# Patient Record
(demographics unavailable — no encounter records)

---

## 2024-11-29 NOTE — HISTORY OF PRESENT ILLNESS
[de-identified] : 63F, here for new patient annual physical. Pt's prior PCP moved.  HLD on atorvastatin 10mg  Hypothyroidism: on levothyroxine 75mcg, last b/w was in 05/2024 pt will take Flu vax today.  Pt saw pulmonologist earlier this week for an episode of bronchitis in 10/2024, s/p xray, to repeat in 1-2 weeks. Pt scheduled to follow up 2/2025.  Pt had Shingles in the past, needs to get Shingrix vaccine series.

## 2024-11-29 NOTE — HEALTH RISK ASSESSMENT
[Patient reported mammogram was normal] : Patient reported mammogram was normal [Patient reported PAP Smear was normal] : Patient reported PAP Smear was normal [Patient reported bone density results were normal] : Patient reported bone density results were normal [Patient reported colonoscopy was normal] : Patient reported colonoscopy was normal [Former] : Former [0-4] : 0-4 [> 15 Years] : > 15 Years [MammogramDate] : 1/2024 [PapSmearDate] : 1/2024 [BoneDensityComments] : ordered by gyn Dr. Michaud  [BoneDensityDate] : 1/2024  [ColonoscopyDate] : 1/2024 [ColonoscopyComments] : negative, pending GI appt in 2/2024,  [de-identified] : social smoker, quit in her 30s

## 2024-11-29 NOTE — HEALTH RISK ASSESSMENT
[Patient reported mammogram was normal] : Patient reported mammogram was normal [Patient reported PAP Smear was normal] : Patient reported PAP Smear was normal [Patient reported bone density results were normal] : Patient reported bone density results were normal [Patient reported colonoscopy was normal] : Patient reported colonoscopy was normal [Former] : Former [0-4] : 0-4 [> 15 Years] : > 15 Years [MammogramDate] : 1/2024 [PapSmearDate] : 1/2024 [BoneDensityDate] : 1/2024  [BoneDensityComments] : ordered by gyn Dr. Michaud  [ColonoscopyDate] : 1/2024 [ColonoscopyComments] : negative, pending GI appt in 2/2024,  [de-identified] : social smoker, quit in her 30s

## 2024-11-29 NOTE — HISTORY OF PRESENT ILLNESS
[de-identified] : 63F, here for new patient annual physical. Pt's prior PCP moved.  HLD on atorvastatin 10mg  Hypothyroidism: on levothyroxine 75mcg, last b/w was in 05/2024 pt will take Flu vax today.  Pt saw pulmonologist earlier this week for an episode of bronchitis in 10/2024, s/p xray, to repeat in 1-2 weeks. Pt scheduled to follow up 2/2025.  Pt had Shingles in the past, needs to get Shingrix vaccine series.

## 2024-11-29 NOTE — PLAN
[FreeTextEntry1] : 63F, here for annual physical.  elevated BP but no HTN diagnosis, pt advised weight loss, low salt diet,  HLD on lipitor 10mg, check lipid panel and CMP hypothyroidism, with weight gain and constipation, will check TFT, may need adjustment  HCM: Routine b/w to screen for DM, get baseline CBC, CMP, UA,  Flu vax today

## 2024-11-29 NOTE — PHYSICAL EXAM
[No Acute Distress] : no acute distress [Well Nourished] : well nourished [Well Developed] : well developed [Well-Appearing] : well-appearing [Normal Sclera/Conjunctiva] : normal sclera/conjunctiva [PERRL] : pupils equal round and reactive to light [EOMI] : extraocular movements intact [Normal Outer Ear/Nose] : the outer ears and nose were normal in appearance [Normal Oropharynx] : the oropharynx was normal [No JVD] : no jugular venous distention [No Lymphadenopathy] : no lymphadenopathy [Supple] : supple [Thyroid Normal, No Nodules] : the thyroid was normal and there were no nodules present [No Respiratory Distress] : no respiratory distress  [No Accessory Muscle Use] : no accessory muscle use [Clear to Auscultation] : lungs were clear to auscultation bilaterally [Normal Rate] : normal rate  [Regular Rhythm] : with a regular rhythm [Normal S1, S2] : normal S1 and S2 [No Murmur] : no murmur heard [No Carotid Bruits] : no carotid bruits [No Abdominal Bruit] : a ~M bruit was not heard ~T in the abdomen [Pedal Pulses Present] : the pedal pulses are present [No Edema] : there was no peripheral edema [No Palpable Aorta] : no palpable aorta [No Extremity Clubbing/Cyanosis] : no extremity clubbing/cyanosis [Soft] : abdomen soft [Non Tender] : non-tender [Non-distended] : non-distended [No HSM] : no HSM [Normal Bowel Sounds] : normal bowel sounds [Normal Posterior Cervical Nodes] : no posterior cervical lymphadenopathy [Normal Anterior Cervical Nodes] : no anterior cervical lymphadenopathy [No CVA Tenderness] : no CVA  tenderness [No Spinal Tenderness] : no spinal tenderness [No Joint Swelling] : no joint swelling [Grossly Normal Strength/Tone] : grossly normal strength/tone [No Rash] : no rash [Coordination Grossly Intact] : coordination grossly intact [No Focal Deficits] : no focal deficits [Normal Gait] : normal gait [Deep Tendon Reflexes (DTR)] : deep tendon reflexes were 2+ and symmetric [Normal Affect] : the affect was normal [Normal Insight/Judgement] : insight and judgment were intact [No Varicosities] : no varicosities [Normal Appearance] : normal in appearance [No Masses] : no palpable masses [No Nipple Discharge] : no nipple discharge [No Axillary Lymphadenopathy] : no axillary lymphadenopathy

## 2025-02-05 NOTE — PHYSICAL EXAM
[No Edema] : there was no peripheral edema [Coordination Grossly Intact] : coordination grossly intact [No Focal Deficits] : no focal deficits [Normal Gait] : normal gait [Normal Affect] : the affect was normal [Normal Insight/Judgement] : insight and judgment were intact [Normal Outer Ear/Nose] : the outer ears and nose were normal in appearance [Normal Oropharynx] : the oropharynx was normal [Normal] : no jugular venous distention, supple, no lymphadenopathy and the thyroid was normal and there were no nodules present [de-identified] : no sinus tenderness, no tonsilar enlargement or exudate

## 2025-02-05 NOTE — HISTORY OF PRESENT ILLNESS
[FreeTextEntry8] : 63F, here for an acute visit for concern of sinus infection.  Pt reports 1 week of post-nasal drip and runny nose and 2 to 3 days of sore throat. Pt reports no fever, no cough/shortness of breathing  pt used pseudoephedrine for sinus congestion.  Rapid Strep today is negative.

## 2025-02-05 NOTE — PHYSICAL EXAM
[No Edema] : there was no peripheral edema [Coordination Grossly Intact] : coordination grossly intact [No Focal Deficits] : no focal deficits [Normal Gait] : normal gait [Normal Affect] : the affect was normal [Normal Insight/Judgement] : insight and judgment were intact [Normal Outer Ear/Nose] : the outer ears and nose were normal in appearance [Normal Oropharynx] : the oropharynx was normal [Normal] : no jugular venous distention, supple, no lymphadenopathy and the thyroid was normal and there were no nodules present [de-identified] : no sinus tenderness, no tonsilar enlargement or exudate

## 2025-02-05 NOTE — PLAN
[FreeTextEntry1] : 63F, here for an acute visit.  sinus congestion: start fluticasone nasal spray and OTC antihistamine Pt reassured of normal exam today. Pt to continue with hydration and rest.

## 2025-02-05 NOTE — PHYSICAL EXAM
[No Edema] : there was no peripheral edema [Coordination Grossly Intact] : coordination grossly intact [No Focal Deficits] : no focal deficits [Normal Gait] : normal gait [Normal Affect] : the affect was normal [Normal Insight/Judgement] : insight and judgment were intact [Normal Outer Ear/Nose] : the outer ears and nose were normal in appearance [Normal Oropharynx] : the oropharynx was normal [Normal] : no jugular venous distention, supple, no lymphadenopathy and the thyroid was normal and there were no nodules present [de-identified] : no sinus tenderness, no tonsilar enlargement or exudate

## 2025-06-23 NOTE — PLAN
[FreeTextEntry1] : 63F here for follow up visit.  HLD on atorvastatin 10mg, tolerating well, will check lipid and CMP Hypothyroidism: check TFT h/o elevated BP, check CBC, BP ok today. get results of DEXA, mammo RTC for CPE

## 2025-06-23 NOTE — HISTORY OF PRESENT ILLNESS
[de-identified] : 63F here for follow up  HLD on atorvastatin 10mg, needs to recheck b/w  hypothyroidism; on LT4 75mcg, needs to recheck TFT Pt s/p mammo and PAP, DEXA, need to obtain record  pt declines Shingrix and pneumonia vaccines at this time.

## 2025-06-23 NOTE — HISTORY OF PRESENT ILLNESS
[de-identified] : 63F here for follow up  HLD on atorvastatin 10mg, needs to recheck b/w  hypothyroidism; on LT4 75mcg, needs to recheck TFT Pt s/p mammo and PAP, DEXA, need to obtain record  pt declines Shingrix and pneumonia vaccines at this time.